# Patient Record
Sex: FEMALE | URBAN - METROPOLITAN AREA
[De-identification: names, ages, dates, MRNs, and addresses within clinical notes are randomized per-mention and may not be internally consistent; named-entity substitution may affect disease eponyms.]

---

## 2018-08-13 ENCOUNTER — TELEPHONE (OUTPATIENT)
Dept: MEDICAL GROUP | Facility: PHYSICIAN GROUP | Age: 38
End: 2018-08-13

## 2018-08-13 NOTE — TELEPHONE ENCOUNTER
Phone Number Called: [phone] (home)     Message: Patient came in. She has an apt. Tomorrow but would like to know if you would write for the medication before she comes in for her apt.     Vyvanse 60 MG    She goes back to [place] and has an at. With her primary on 9/9/18 and just wants a bridge.     Okay to leave detailed voice mail: yes

## 2022-03-09 ENCOUNTER — OFFICE VISIT (OUTPATIENT)
Dept: URBAN - METROPOLITAN AREA CLINIC 91 | Facility: CLINIC | Age: 42
End: 2022-03-09

## 2022-03-09 DIAGNOSIS — B02.39 OTHER HERPES ZOSTER EYE DISEASE: ICD-10-CM

## 2022-03-09 PROCEDURE — 99203 OFFICE O/P NEW LOW 30 MIN: CPT | Performed by: OPHTHALMOLOGY

## 2022-03-09 RX ORDER — DUREZOL 0.5 MG/ML
0.05 % EMULSION OPHTHALMIC
Qty: 10 | Refills: 1 | Status: INACTIVE
Start: 2022-03-09 | End: 2022-03-09

## 2022-03-09 RX ORDER — VALACYCLOVIR 500 MG/1
500 MG TABLET, FILM COATED ORAL
Qty: 60 | Refills: 3 | Status: ACTIVE
Start: 2022-03-09

## 2022-03-09 RX ORDER — DUREZOL 0.5 MG/ML
0.05 % EMULSION OPHTHALMIC
Qty: 5 | Refills: 3 | Status: ACTIVE
Start: 2022-03-09

## 2022-03-09 ASSESSMENT — INTRAOCULAR PRESSURE
OS: 17
OD: 17

## 2022-03-09 NOTE — IMPRESSION/PLAN
Impression: Uveitis: H20.9. Plan: For iritis I have recommended patient use durezol and return as directed.

## 2022-03-16 ENCOUNTER — OFFICE VISIT (OUTPATIENT)
Dept: URBAN - METROPOLITAN AREA CLINIC 91 | Facility: CLINIC | Age: 42
End: 2022-03-16

## 2022-03-16 DIAGNOSIS — H20.9 UVEITIS: Primary | ICD-10-CM

## 2022-03-16 PROCEDURE — 99212 OFFICE O/P EST SF 10 MIN: CPT | Performed by: OPHTHALMOLOGY

## 2022-03-16 ASSESSMENT — INTRAOCULAR PRESSURE
OS: 16
OD: 20